# Patient Record
Sex: MALE | Race: WHITE
[De-identification: names, ages, dates, MRNs, and addresses within clinical notes are randomized per-mention and may not be internally consistent; named-entity substitution may affect disease eponyms.]

---

## 2018-02-15 ENCOUNTER — APPOINTMENT (OUTPATIENT)
Dept: RADIOLOGY | Facility: CLINIC | Age: 28
End: 2018-02-15

## 2018-02-15 ENCOUNTER — OUTPATIENT (OUTPATIENT)
Dept: OUTPATIENT SERVICES | Facility: HOSPITAL | Age: 28
LOS: 1 days | End: 2018-02-15
Payer: COMMERCIAL

## 2018-02-15 PROBLEM — Z00.00 ENCOUNTER FOR PREVENTIVE HEALTH EXAMINATION: Status: ACTIVE | Noted: 2018-02-15

## 2018-02-15 PROCEDURE — 73590 X-RAY EXAM OF LOWER LEG: CPT

## 2018-02-15 PROCEDURE — 73590 X-RAY EXAM OF LOWER LEG: CPT | Mod: 26,RT

## 2018-02-15 PROCEDURE — 73610 X-RAY EXAM OF ANKLE: CPT | Mod: 26,RT

## 2018-02-15 PROCEDURE — 73610 X-RAY EXAM OF ANKLE: CPT

## 2018-11-06 ENCOUNTER — TRANSCRIPTION ENCOUNTER (OUTPATIENT)
Age: 28
End: 2018-11-06

## 2021-04-29 ENCOUNTER — TRANSCRIPTION ENCOUNTER (OUTPATIENT)
Age: 31
End: 2021-04-29

## 2024-05-09 ENCOUNTER — APPOINTMENT (OUTPATIENT)
Dept: DERMATOLOGY | Facility: CLINIC | Age: 34
End: 2024-05-09
Payer: COMMERCIAL

## 2024-05-09 DIAGNOSIS — L73.9 FOLLICULAR DISORDER, UNSPECIFIED: ICD-10-CM

## 2024-05-09 DIAGNOSIS — D22.9 MELANOCYTIC NEVI, UNSPECIFIED: ICD-10-CM

## 2024-05-09 DIAGNOSIS — D17.1 BENIGN LIPOMATOUS NEOPLASM OF SKIN AND SUBCUTANEOUS TISSUE OF TRUNK: ICD-10-CM

## 2024-05-09 PROCEDURE — 99204 OFFICE O/P NEW MOD 45 MIN: CPT

## 2024-05-09 RX ORDER — CLINDAMYCIN PHOSPHATE 10 MG/ML
1 LOTION TOPICAL
Qty: 1 | Refills: 5 | Status: ACTIVE | COMMUNITY
Start: 2024-05-09 | End: 1900-01-01

## 2024-05-09 NOTE — ASSESSMENT
[FreeTextEntry1] : #Multiple benign nevi - chronic, stable - I discussed the chronic nature and course of the condition - Photoprotection discussed, recommend daily broad-spectrum sunscreen, SPF 30 or greater, UPF hat, clothing. 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 Can schedule PRN.  RTC 1 year for TBSE, sooner PRN

## 2024-05-09 NOTE — HISTORY OF PRESENT ILLNESS
[FreeTextEntry1] : tbse [de-identified] : 33yo M presents for tbse no personal hx of skin cancer; father with hx of skin cancer, unsure which type wears sunscreen has two skin lesions, one on L abdomen and one on L lateral chest near axilla one on the abdomen is bothersome d/t location and size, wants removal  SH: wife is a peds resident at [place], starting [employer] fellowship. Having baby soon.